# Patient Record
Sex: MALE | Race: WHITE | NOT HISPANIC OR LATINO | Employment: OTHER | URBAN - METROPOLITAN AREA
[De-identification: names, ages, dates, MRNs, and addresses within clinical notes are randomized per-mention and may not be internally consistent; named-entity substitution may affect disease eponyms.]

---

## 2020-09-11 NOTE — PATIENT DISCUSSION
NON LVC 2' SEVERE ASTIGMATISM, FELIBERTO SUSPECT PER TOPOS, AND CLXT W/ CYCLOTORSION S/P STRAB SX. EDU ON FINDINGS. MONITOR.

## 2022-11-10 ENCOUNTER — NEW PATIENT (OUTPATIENT)
Dept: URBAN - METROPOLITAN AREA CLINIC 49 | Facility: CLINIC | Age: 72
End: 2022-11-10

## 2022-11-10 DIAGNOSIS — H35.373: ICD-10-CM

## 2022-11-10 DIAGNOSIS — H40.1131: ICD-10-CM

## 2022-11-10 DIAGNOSIS — H35.3121: ICD-10-CM

## 2022-11-10 DIAGNOSIS — H25.813: ICD-10-CM

## 2022-11-10 PROCEDURE — 76514 ECHO EXAM OF EYE THICKNESS: CPT

## 2022-11-10 PROCEDURE — 92015 DETERMINE REFRACTIVE STATE: CPT

## 2022-11-10 PROCEDURE — 92134 CPTRZ OPH DX IMG PST SGM RTA: CPT

## 2022-11-10 PROCEDURE — 92004 COMPRE OPH EXAM NEW PT 1/>: CPT

## 2022-11-10 ASSESSMENT — VISUAL ACUITY
OD_GLARE: 20/150
OS_CC: 20/40-2
OD_CC: J16
OU_CC: 20/40
OD_CC: 20/80-2
OS_CC: J1
OS_GLARE: 20/100
OU_CC: J1
OS_PH: 20/25
OS_GLARE: 20/80

## 2022-11-10 ASSESSMENT — TONOMETRY
OD_IOP_MMHG: 16
OS_IOP_MMHG: 15
OS_IOP_MMHG: 14
OD_IOP_MMHG: 15

## 2022-11-10 ASSESSMENT — PACHYMETRY
OD_CT_UM: 530
OS_CT_UM: 528

## 2022-11-28 ENCOUNTER — PRE-OP/H&P (OUTPATIENT)
Dept: URBAN - METROPOLITAN AREA CLINIC 49 | Facility: CLINIC | Age: 72
End: 2022-11-28

## 2022-11-28 DIAGNOSIS — H25.813: ICD-10-CM

## 2022-11-28 PROCEDURE — 92136 OPHTHALMIC BIOMETRY: CPT

## 2022-11-28 PROCEDURE — PREOP PRE OP VISIT

## 2022-11-28 ASSESSMENT — TONOMETRY
OD_IOP_MMHG: 15
OS_IOP_MMHG: 15
OS_IOP_MMHG: 16
OD_IOP_MMHG: 16

## 2022-11-28 ASSESSMENT — KERATOMETRY
OD_AXISANGLE2_DEGREES: 4
OS_K1POWER_DIOPTERS: 45.87
OS_AXISANGLE_DEGREES: 101
OS_K2POWER_DIOPTERS: 45.25
OD_K2POWER_DIOPTERS: 44.87
OD_K1POWER_DIOPTERS: 45.75
OS_AXISANGLE2_DEGREES: 11
OD_AXISANGLE_DEGREES: 094

## 2022-11-28 ASSESSMENT — VISUAL ACUITY
OS_CC: 20/40+2
OS_PH: 20/30-2
OD_CC: 20/100

## 2022-12-06 ENCOUNTER — SURGERY/PROCEDURE (OUTPATIENT)
Dept: URBAN - METROPOLITAN AREA SURGERY 16 | Facility: SURGERY | Age: 72
End: 2022-12-06

## 2022-12-06 ENCOUNTER — POST-OP (OUTPATIENT)
Dept: URBAN - METROPOLITAN AREA CLINIC 48 | Facility: LOCATION | Age: 72
End: 2022-12-06

## 2022-12-06 PROCEDURE — 66984 XCAPSL CTRC RMVL W/O ECP: CPT

## 2022-12-06 ASSESSMENT — KERATOMETRY
OS_AXISANGLE_DEGREES: 101
OS_AXISANGLE2_DEGREES: 11
OD_K1POWER_DIOPTERS: 45.75
OD_K2POWER_DIOPTERS: 44.87
OD_K1POWER_DIOPTERS: 45.75
OS_K1POWER_DIOPTERS: 45.87
OS_K2POWER_DIOPTERS: 45.25
OD_AXISANGLE_DEGREES: 094
OD_AXISANGLE2_DEGREES: 4
OS_AXISANGLE2_DEGREES: 11
OD_AXISANGLE2_DEGREES: 4
OS_K1POWER_DIOPTERS: 45.87
OS_K2POWER_DIOPTERS: 45.25
OS_AXISANGLE_DEGREES: 101
OD_K2POWER_DIOPTERS: 44.87
OD_AXISANGLE_DEGREES: 094

## 2022-12-06 ASSESSMENT — VISUAL ACUITY: OD_SC: 20/200

## 2022-12-06 ASSESSMENT — TONOMETRY: OD_IOP_MMHG: 21

## 2022-12-12 ENCOUNTER — POST OP/EVAL OF SECOND EYE (OUTPATIENT)
Dept: URBAN - METROPOLITAN AREA CLINIC 49 | Facility: CLINIC | Age: 72
End: 2022-12-12

## 2022-12-12 PROCEDURE — 92136 - 2N OPHTHALMIC BIOMETRY BY PARTIAL COHERENCE INTERFEROMETRY WITH INTRAOCULAR LENS POWER CALCULATION

## 2022-12-12 PROCEDURE — 99213 OFFICE O/P EST LOW 20 MIN: CPT

## 2022-12-12 ASSESSMENT — KERATOMETRY
OS_AXISANGLE2_DEGREES: 115
OD_AXISANGLE2_DEGREES: 93
OD_AXISANGLE_DEGREES: 003
OS_K1POWER_DIOPTERS: 46.00
OD_K2POWER_DIOPTERS: 46.50
OD_K1POWER_DIOPTERS: 45.50
OS_K2POWER_DIOPTERS: 46.50
OS_AXISANGLE_DEGREES: 025

## 2022-12-12 ASSESSMENT — TONOMETRY
OS_IOP_MMHG: 22
OS_IOP_MMHG: 21
OD_IOP_MMHG: 16
OD_IOP_MMHG: 17

## 2022-12-12 ASSESSMENT — VISUAL ACUITY
OD_SC: 20/60-1
OS_GLARE: 20/400
OS_GLARE: 20/100
OS_CC: 20/40

## 2022-12-20 ENCOUNTER — SURGERY/PROCEDURE (OUTPATIENT)
Dept: URBAN - METROPOLITAN AREA SURGERY 16 | Facility: SURGERY | Age: 72
End: 2022-12-20

## 2022-12-20 ENCOUNTER — POST-OP (OUTPATIENT)
Dept: URBAN - METROPOLITAN AREA CLINIC 48 | Facility: LOCATION | Age: 72
End: 2022-12-20

## 2022-12-20 PROCEDURE — 66984 XCAPSL CTRC RMVL W/O ECP: CPT

## 2022-12-20 RX ORDER — SODIUM CHLORIDE 50 MG/ML: 1 SOLUTION OPHTHALMIC

## 2022-12-20 ASSESSMENT — KERATOMETRY
OD_K1POWER_DIOPTERS: 45.50
OD_AXISANGLE_DEGREES: 003
OS_AXISANGLE2_DEGREES: 115
OS_K2POWER_DIOPTERS: 46.50
OD_K1POWER_DIOPTERS: 45.50
OD_K2POWER_DIOPTERS: 46.50
OS_AXISANGLE2_DEGREES: 115
OS_K1POWER_DIOPTERS: 46.00
OD_AXISANGLE_DEGREES: 003
OD_AXISANGLE2_DEGREES: 93
OS_K1POWER_DIOPTERS: 46.00
OS_AXISANGLE_DEGREES: 025
OS_K2POWER_DIOPTERS: 46.50
OS_AXISANGLE_DEGREES: 025
OD_K2POWER_DIOPTERS: 46.50
OD_AXISANGLE2_DEGREES: 93

## 2022-12-20 ASSESSMENT — TONOMETRY
OS_IOP_MMHG: 14
OS_IOP_MMHG: 15

## 2022-12-20 ASSESSMENT — VISUAL ACUITY: OS_SC: 20/200

## 2022-12-28 ENCOUNTER — POST-OP (OUTPATIENT)
Dept: URBAN - METROPOLITAN AREA CLINIC 48 | Facility: LOCATION | Age: 72
End: 2022-12-28

## 2022-12-28 PROCEDURE — 99024 POSTOP FOLLOW-UP VISIT: CPT

## 2022-12-28 ASSESSMENT — TONOMETRY
OD_IOP_MMHG: 17
OS_IOP_MMHG: 16
OS_IOP_MMHG: 17
OD_IOP_MMHG: 16

## 2022-12-28 ASSESSMENT — VISUAL ACUITY
OS_SC: 20/25
OD_SC: 20/50-2

## 2022-12-28 ASSESSMENT — KERATOMETRY
OS_AXISANGLE_DEGREES: 025
OS_K1POWER_DIOPTERS: 46.00
OD_K1POWER_DIOPTERS: 45.50
OD_AXISANGLE2_DEGREES: 93
OD_AXISANGLE_DEGREES: 003
OS_AXISANGLE2_DEGREES: 115
OD_K2POWER_DIOPTERS: 46.50
OS_K2POWER_DIOPTERS: 46.50

## 2023-01-16 ENCOUNTER — POST-OP (OUTPATIENT)
Dept: URBAN - METROPOLITAN AREA CLINIC 49 | Facility: CLINIC | Age: 73
End: 2023-01-16

## 2023-01-16 DIAGNOSIS — H40.1131: ICD-10-CM

## 2023-01-16 DIAGNOSIS — H53.021: ICD-10-CM

## 2023-01-16 DIAGNOSIS — Z96.1: ICD-10-CM

## 2023-01-16 DIAGNOSIS — H35.3121: ICD-10-CM

## 2023-01-16 DIAGNOSIS — Z98.42: ICD-10-CM

## 2023-01-16 DIAGNOSIS — H35.373: ICD-10-CM

## 2023-01-16 DIAGNOSIS — Z98.41: ICD-10-CM

## 2023-01-16 PROCEDURE — 92015 DETERMINE REFRACTIVE STATE: CPT

## 2023-01-16 PROCEDURE — 99024 POSTOP FOLLOW-UP VISIT: CPT

## 2023-01-16 PROCEDURE — 92134 CPTRZ OPH DX IMG PST SGM RTA: CPT

## 2023-01-16 ASSESSMENT — VISUAL ACUITY
OS_GLARE: 20/25
OD_GLARE: 20/80
OS_GLARE: 20/30
OD_SC: 20/60+2
OS_SC: 20/25

## 2023-01-16 ASSESSMENT — TONOMETRY
OD_IOP_MMHG: 12
OS_IOP_MMHG: 12
OD_IOP_MMHG: 12
OS_IOP_MMHG: 10
OS_IOP_MMHG: 13
OD_IOP_MMHG: 13

## 2023-01-16 ASSESSMENT — KERATOMETRY
OS_AXISANGLE_DEGREES: 025
OS_K2POWER_DIOPTERS: 46.50
OS_AXISANGLE2_DEGREES: 115
OD_AXISANGLE_DEGREES: 003
OD_AXISANGLE2_DEGREES: 93
OD_K2POWER_DIOPTERS: 46.50
OS_K1POWER_DIOPTERS: 46.00
OD_K1POWER_DIOPTERS: 45.50

## 2024-01-22 ENCOUNTER — COMPREHENSIVE EXAM (OUTPATIENT)
Dept: URBAN - METROPOLITAN AREA CLINIC 49 | Facility: LOCATION | Age: 74
End: 2024-01-22

## 2024-01-22 DIAGNOSIS — H35.373: ICD-10-CM

## 2024-01-22 DIAGNOSIS — H53.021: ICD-10-CM

## 2024-01-22 DIAGNOSIS — H40.1131: ICD-10-CM

## 2024-01-22 DIAGNOSIS — H26.493: ICD-10-CM

## 2024-01-22 DIAGNOSIS — H35.3120: ICD-10-CM

## 2024-01-22 PROCEDURE — 66821 AFTER CATARACT LASER SURGERY: CPT

## 2024-01-22 PROCEDURE — 99214 OFFICE O/P EST MOD 30 MIN: CPT

## 2024-01-22 PROCEDURE — 92134 CPTRZ OPH DX IMG PST SGM RTA: CPT

## 2024-01-22 ASSESSMENT — VISUAL ACUITY
OS_GLARE: 20/30
OD_SC: 20/60
OU_CC: J1 @ 16IN
OS_SC: 20/20
OD_GLARE: 20/80
OU_SC: 20/20
OD_CC: 20/60
OS_GLARE: 20/20
OD_GLARE: 20/70
OU_CC: 20/25-1 PUSH
OS_CC: 20/30

## 2024-01-22 ASSESSMENT — TONOMETRY
OS_IOP_MMHG: 13
OD_IOP_MMHG: 12
OD_IOP_MMHG: 13
OS_IOP_MMHG: 12

## 2024-02-19 ENCOUNTER — FOLLOW UP (OUTPATIENT)
Dept: URBAN - METROPOLITAN AREA CLINIC 49 | Facility: LOCATION | Age: 74
End: 2024-02-19

## 2024-02-19 DIAGNOSIS — H26.491: ICD-10-CM

## 2024-02-19 DIAGNOSIS — H53.031: ICD-10-CM

## 2024-02-19 DIAGNOSIS — Z98.890: ICD-10-CM

## 2024-02-19 DIAGNOSIS — H40.1131: ICD-10-CM

## 2024-02-19 PROCEDURE — 92015 DETERMINE REFRACTIVE STATE: CPT

## 2024-02-19 PROCEDURE — 92133 CPTRZD OPH DX IMG PST SGM ON: CPT

## 2024-02-19 ASSESSMENT — VISUAL ACUITY
OS_CC: 20/25
OU_CC: J1+@18IN
OS_SC: 20/20
OD_CC: 20/40
OD_SC: 20/50

## 2024-02-19 ASSESSMENT — TONOMETRY
OS_IOP_MMHG: 16
OD_IOP_MMHG: 17
OD_IOP_MMHG: 16
OS_IOP_MMHG: 17

## 2024-06-25 ENCOUNTER — EMERGENCY VISIT (OUTPATIENT)
Dept: URBAN - METROPOLITAN AREA CLINIC 53 | Facility: CLINIC | Age: 74
End: 2024-06-25

## 2024-06-25 DIAGNOSIS — H43.812: ICD-10-CM

## 2024-06-25 PROCEDURE — 99214 OFFICE O/P EST MOD 30 MIN: CPT

## 2024-06-25 ASSESSMENT — TONOMETRY
OD_IOP_MMHG: 15
OS_IOP_MMHG: 13
OS_IOP_MMHG: 14
OD_IOP_MMHG: 16

## 2024-06-25 ASSESSMENT — VISUAL ACUITY
OD_SC: 20/50+2
OS_CC: 20/25-1
OS_SC: 20/25+1
OD_CC: 20/40

## 2024-09-11 ENCOUNTER — FOLLOW UP (OUTPATIENT)
Dept: URBAN - METROPOLITAN AREA CLINIC 49 | Facility: LOCATION | Age: 74
End: 2024-09-11

## 2024-09-11 DIAGNOSIS — H40.1131: ICD-10-CM

## 2024-09-11 DIAGNOSIS — H26.491: ICD-10-CM

## 2024-09-11 PROCEDURE — 99213 OFFICE O/P EST LOW 20 MIN: CPT

## 2024-09-11 PROCEDURE — 92020 GONIOSCOPY: CPT

## 2025-03-19 ENCOUNTER — COMPREHENSIVE EXAM (OUTPATIENT)
Age: 75
End: 2025-03-19

## 2025-03-19 DIAGNOSIS — H40.1131: ICD-10-CM

## 2025-03-19 DIAGNOSIS — H35.3122: ICD-10-CM

## 2025-03-19 DIAGNOSIS — H47.322: ICD-10-CM

## 2025-03-19 DIAGNOSIS — H43.811: ICD-10-CM

## 2025-03-19 DIAGNOSIS — H26.491: ICD-10-CM

## 2025-03-19 DIAGNOSIS — H35.373: ICD-10-CM

## 2025-03-19 PROCEDURE — 92133 CPTRZD OPH DX IMG PST SGM ON: CPT

## 2025-03-19 PROCEDURE — 92083 EXTENDED VISUAL FIELD XM: CPT

## 2025-03-19 PROCEDURE — 99214 OFFICE O/P EST MOD 30 MIN: CPT
